# Patient Record
Sex: MALE | Race: ASIAN | NOT HISPANIC OR LATINO | URBAN - METROPOLITAN AREA
[De-identification: names, ages, dates, MRNs, and addresses within clinical notes are randomized per-mention and may not be internally consistent; named-entity substitution may affect disease eponyms.]

---

## 2017-02-03 ENCOUNTER — EMERGENCY (EMERGENCY)
Facility: HOSPITAL | Age: 42
LOS: 1 days | Discharge: HOME | End: 2017-02-03
Attending: EMERGENCY MEDICINE | Admitting: EMERGENCY MEDICINE
Payer: COMMERCIAL

## 2017-02-03 VITALS
TEMPERATURE: 98 F | SYSTOLIC BLOOD PRESSURE: 141 MMHG | RESPIRATION RATE: 18 BRPM | DIASTOLIC BLOOD PRESSURE: 94 MMHG | OXYGEN SATURATION: 96 % | HEART RATE: 107 BPM

## 2017-02-03 VITALS
SYSTOLIC BLOOD PRESSURE: 122 MMHG | WEIGHT: 171.96 LBS | RESPIRATION RATE: 18 BRPM | HEART RATE: 101 BPM | OXYGEN SATURATION: 96 % | TEMPERATURE: 98 F | HEIGHT: 70 IN | DIASTOLIC BLOOD PRESSURE: 83 MMHG

## 2017-02-03 DIAGNOSIS — Z98.890 OTHER SPECIFIED POSTPROCEDURAL STATES: Chronic | ICD-10-CM

## 2017-02-03 DIAGNOSIS — F32.1 MAJOR DEPRESSIVE DISORDER, SINGLE EPISODE, MODERATE: ICD-10-CM

## 2017-02-03 DIAGNOSIS — M54.2 CERVICALGIA: ICD-10-CM

## 2017-02-03 DIAGNOSIS — F32.9 MAJOR DEPRESSIVE DISORDER, SINGLE EPISODE, UNSPECIFIED: ICD-10-CM

## 2017-02-03 DIAGNOSIS — Z88.1 ALLERGY STATUS TO OTHER ANTIBIOTIC AGENTS STATUS: ICD-10-CM

## 2017-02-03 DIAGNOSIS — F41.9 ANXIETY DISORDER, UNSPECIFIED: ICD-10-CM

## 2017-02-03 DIAGNOSIS — F41.0 PANIC DISORDER [EPISODIC PAROXYSMAL ANXIETY]: ICD-10-CM

## 2017-02-03 LAB
ANION GAP SERPL CALC-SCNC: 18 MMOL/L — HIGH (ref 5–17)
APAP SERPL-MCNC: <15 UG/ML — SIGNIFICANT CHANGE UP (ref 10–30)
BASOPHILS # BLD AUTO: 0 K/UL — SIGNIFICANT CHANGE UP (ref 0–0.2)
BASOPHILS NFR BLD AUTO: 0.3 % — SIGNIFICANT CHANGE UP (ref 0–2)
BUN SERPL-MCNC: 15 MG/DL — SIGNIFICANT CHANGE UP (ref 7–23)
CALCIUM SERPL-MCNC: 9.2 MG/DL — SIGNIFICANT CHANGE UP (ref 8.4–10.5)
CHLORIDE SERPL-SCNC: 100 MMOL/L — SIGNIFICANT CHANGE UP (ref 96–108)
CO2 SERPL-SCNC: 21 MMOL/L — LOW (ref 22–31)
CREAT SERPL-MCNC: 0.85 MG/DL — SIGNIFICANT CHANGE UP (ref 0.5–1.3)
EOSINOPHIL # BLD AUTO: 0 K/UL — SIGNIFICANT CHANGE UP (ref 0–0.5)
EOSINOPHIL NFR BLD AUTO: 0.2 % — SIGNIFICANT CHANGE UP (ref 0–6)
GLUCOSE SERPL-MCNC: 122 MG/DL — HIGH (ref 70–99)
HCT VFR BLD CALC: 47.9 % — SIGNIFICANT CHANGE UP (ref 39–50)
HGB BLD-MCNC: 16.9 G/DL — SIGNIFICANT CHANGE UP (ref 13–17)
LYMPHOCYTES # BLD AUTO: 1.4 K/UL — SIGNIFICANT CHANGE UP (ref 1–3.3)
LYMPHOCYTES # BLD AUTO: 18 % — SIGNIFICANT CHANGE UP (ref 13–44)
MCHC RBC-ENTMCNC: 32.2 PG — SIGNIFICANT CHANGE UP (ref 27–34)
MCHC RBC-ENTMCNC: 35.2 GM/DL — SIGNIFICANT CHANGE UP (ref 32–36)
MCV RBC AUTO: 91.4 FL — SIGNIFICANT CHANGE UP (ref 80–100)
MONOCYTES # BLD AUTO: 0.4 K/UL — SIGNIFICANT CHANGE UP (ref 0–0.9)
MONOCYTES NFR BLD AUTO: 5.6 % — SIGNIFICANT CHANGE UP (ref 2–14)
NEUTROPHILS # BLD AUTO: 5.7 K/UL — SIGNIFICANT CHANGE UP (ref 1.8–7.4)
NEUTROPHILS NFR BLD AUTO: 75.8 % — SIGNIFICANT CHANGE UP (ref 43–77)
PLATELET # BLD AUTO: 248 K/UL — SIGNIFICANT CHANGE UP (ref 150–400)
POTASSIUM SERPL-MCNC: 3.8 MMOL/L — SIGNIFICANT CHANGE UP (ref 3.5–5.3)
POTASSIUM SERPL-SCNC: 3.8 MMOL/L — SIGNIFICANT CHANGE UP (ref 3.5–5.3)
RBC # BLD: 5.24 M/UL — SIGNIFICANT CHANGE UP (ref 4.2–5.8)
RBC # FLD: 12.1 % — SIGNIFICANT CHANGE UP (ref 10.3–14.5)
SALICYLATES SERPL-MCNC: <2 MG/DL — LOW (ref 15–30)
SODIUM SERPL-SCNC: 139 MMOL/L — SIGNIFICANT CHANGE UP (ref 135–145)
WBC # BLD: 7.5 K/UL — SIGNIFICANT CHANGE UP (ref 3.8–10.5)
WBC # FLD AUTO: 7.5 K/UL — SIGNIFICANT CHANGE UP (ref 3.8–10.5)

## 2017-02-03 PROCEDURE — 85027 COMPLETE CBC AUTOMATED: CPT

## 2017-02-03 PROCEDURE — 93005 ELECTROCARDIOGRAM TRACING: CPT

## 2017-02-03 PROCEDURE — 80048 BASIC METABOLIC PNL TOTAL CA: CPT

## 2017-02-03 PROCEDURE — 80307 DRUG TEST PRSMV CHEM ANLYZR: CPT

## 2017-02-03 PROCEDURE — 99285 EMERGENCY DEPT VISIT HI MDM: CPT | Mod: 25

## 2017-02-03 PROCEDURE — 93010 ELECTROCARDIOGRAM REPORT: CPT | Mod: 59

## 2017-02-03 PROCEDURE — 99284 EMERGENCY DEPT VISIT MOD MDM: CPT | Mod: 25

## 2017-02-03 RX ORDER — TRAZODONE HCL 50 MG
1 TABLET ORAL
Qty: 14 | Refills: 0 | OUTPATIENT
Start: 2017-02-03 | End: 2017-02-17

## 2017-02-03 RX ORDER — ESCITALOPRAM OXALATE 10 MG/1
0 TABLET, FILM COATED ORAL
Qty: 0 | Refills: 0 | COMMUNITY

## 2017-02-03 NOTE — ED PROVIDER NOTE - PROGRESS NOTE DETAILS
AVINASH: Anxious, pending psyche consult on [place] [facility] called. State that the starting dose of the pt's Lexapro was very high and is likely causing his symptoms. Recommend that patient take half the dose he has been prescribed and that he also start taking a Trazodone for sleep. They provided patient with resource for psychiatry clinic as well. Ron Grady, Resident.

## 2017-02-03 NOTE — ED BEHAVIORAL HEALTH ASSESSMENT NOTE - SUMMARY
41 y/o [place] male with no psychiatric history, presents with six weeks of dysphoria, guilt, anergia, middle insomnia and associated panic anxiety, in the context of residual severe pain and paresthesias from cervical radiculopathy and related surgery.  Also has jitteriness, restlessness and severe nausea in last 36 hours, related to starting an inappropriately high dose of escitalopram.

## 2017-02-03 NOTE — ED BEHAVIORAL HEALTH ASSESSMENT NOTE - RISK ASSESSMENT
Denying suicidal thinking.  spoke with pt's wife, who notes patient has not discussed suicide and has no history of suicidal thinking. no psychosis.  no substance misuse.  instantly relieved when I told him that it was the dose of the new medication likely causing his severe distress in the last two days.  smiled.  looking forward to surgery follow-up appt next week.

## 2017-02-03 NOTE — ED PROVIDER NOTE - MEDICAL DECISION MAKING DETAILS
42M history of neck surgery 3 months ago coming in with signs and symptoms concerning for depression from chronic pain. Patient states that he has lost his appetite, has trouble sleeping and is feeling anxious. Went to PMD who prescribed lexapro and took the first dose yesterday. Denies SI/HI, drug, etoh use. On [place], Reassess psyche consult,

## 2017-02-03 NOTE — ED ADULT NURSE NOTE - OBJECTIVE STATEMENT
Patient came to ed after feeling dizzy at home  unable to sleep or eat Feeling increased anxiety  Patient calm and in good control Security ca.me down and wanded him put in alex..n all labs drawn ekg completed pt denies any si hi Patient came to ed after feeling dizzy at home  unable to sleep or eat Feeling increased anxiety  Patient calm and in good control Security ca.me down and wanded him put in alex..n all labs drawn ekg completed pt denies any si hi pt seen by medicine

## 2017-02-03 NOTE — ED BEHAVIORAL HEALTH ASSESSMENT NOTE - SUICIDE PROTECTIVE FACTORS
Responsibility to family and others/Future oriented/Supportive social network or family/Engaged in work or school

## 2017-02-03 NOTE — ED PROVIDER NOTE - OBJECTIVE STATEMENT
42M history of neck surgery 3 months ago coming in with signs and symptoms concerning for depression from chronic pain. Patient states that he has lost his appetite, has trouble sleeping and is feeling anxious. Went to PMD who prescribed lexapro and took the first dose yesterday. Denies SI/HI, drug, etoh use.     PMD: Carolynn

## 2017-02-03 NOTE — ED BEHAVIORAL HEALTH ASSESSMENT NOTE - MEDICATIONS (PRESCRIPTIONS, DIRECTIONS)
reduce escitalopram to 5 mg daily (cut quarters), provided education as to expected time for relief, add trazadone 50 mg qhs x 2 weeks to target insomnia

## 2017-02-03 NOTE — ED BEHAVIORAL HEALTH ASSESSMENT NOTE - HPI (INCLUDE ILLNESS QUALITY, SEVERITY, DURATION, TIMING, CONTEXT, MODIFYING FACTORS, ASSOCIATED SIGNS AND SYMPTOMS)
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  Also c/o roughly six weeks of depressed mood, diminished sleep, anergia and guilt over his wife needing to work so much to cover him at the family business -- all in the context of ongoing pain and paresthesias in upper extremities bilaterally, now three months s/p cervical fusion surgery at [facility].  Says the posterior shoulder and chest pain improved post-op, but the upper extremity pain remains and is significantly distressing him.  presented to PCP yesterday with c/o depressive sx -- got escitalopram 20 mg, took one dose yesterday, had severe nausea and a sleepless night.    Also describes panic anxiety, characterized by shallow breathing, feeling of claustrophobia, and palpitations.  Denies suicidal thinking.

## 2024-07-24 NOTE — ED PROVIDER NOTE - CPE EDP CARDIAC NORM
session with decreased rest. Plan to continue to progress exercises per patient tolerance in accordance with post op protocol  The patient has functional impairments and/or activity limitations and would benefit from continued outpatient therapy services to address the deficits outlined in the patients goals    Return to Play: NA    Prognosis for POC: [x] Good [] Fair  [] Poor    Patient requires continued skilled intervention: [x] Yes  [] No      CHARGE CAPTURE     PT CHARGE GRID   CPT Code (TIMED) minutes # CPT Code (UNTIMED) #     Therex (57947)  35 2  EVAL:LOW (49189 - Typically 20 minutes face-to-face)     Neuromusc. Re-ed (50979)    Re-Eval (98998)     Manual (36897) 10 1  Estim Unattended (81410)     Ther. Act (94116)    Mech. Traction (21780)     Gait (17715)    Dry Needle 1-2 muscle (20560)     Aquatic Therex (55228)    Dry Needle 3+ muscle (20561)     Iontophoresis (84770)    VASO (95386)     Ultrasound (59512)    Group Therapy (62151)     Estim Attended (71508)    Canalith Repositioning (71898)     Other:    Other:    Total Timed Code Tx Minutes 45 3       Total Treatment Minutes 55        Charge Justification:  (86014) THERAPEUTIC EXERCISE - Provided verbal/tactile cueing for activities related to strengthening, flexibility, endurance, ROM performed to prevent loss of range of motion, maintain or improve muscular strength or increase flexibility, following either an injury or surgery.   (75368) HOME EXERCISE PROGRAM - Reviewed/Progressed HEP activities related to strengthening, flexibility, endurance, ROM performed to prevent loss of range of motion, maintain or improve muscular strength or increase flexibility, following either an injury or surgery.  (97582) MANUAL THERAPY -  Manual therapy techniques, 1 or more regions, each 15 minutes (Mobilization/manipulation, manual lymphatic drainage, manual traction) for the purpose of modulating pain, promoting relaxation,  increasing ROM, reducing/eliminating 
normal...

## 2025-03-19 NOTE — ED PROVIDER NOTE - CPE EDP SKIN NORM
Attempted to call patient, left message to call office. Sent my chart message as well. See Dr. Hinkle's instructions below.   normal...